# Patient Record
Sex: FEMALE | Race: WHITE | NOT HISPANIC OR LATINO | Employment: OTHER | URBAN - METROPOLITAN AREA
[De-identification: names, ages, dates, MRNs, and addresses within clinical notes are randomized per-mention and may not be internally consistent; named-entity substitution may affect disease eponyms.]

---

## 2017-11-29 ENCOUNTER — LAB VISIT (OUTPATIENT)
Dept: LAB | Facility: HOSPITAL | Age: 62
End: 2017-11-29
Attending: INTERNAL MEDICINE
Payer: MEDICAID

## 2017-11-29 ENCOUNTER — OFFICE VISIT (OUTPATIENT)
Dept: HEMATOLOGY/ONCOLOGY | Facility: CLINIC | Age: 62
End: 2017-11-29
Payer: MEDICAID

## 2017-11-29 VITALS
WEIGHT: 158.06 LBS | DIASTOLIC BLOOD PRESSURE: 74 MMHG | OXYGEN SATURATION: 99 % | HEIGHT: 65 IN | HEART RATE: 76 BPM | BODY MASS INDEX: 26.33 KG/M2 | TEMPERATURE: 98 F | SYSTOLIC BLOOD PRESSURE: 138 MMHG

## 2017-11-29 DIAGNOSIS — D69.6 DECREASED PLATELET COUNT: Primary | ICD-10-CM

## 2017-11-29 DIAGNOSIS — D69.6 DECREASED PLATELET COUNT: ICD-10-CM

## 2017-11-29 LAB
ERYTHROCYTE [DISTWIDTH] IN BLOOD BY AUTOMATED COUNT: 13.1 %
HCT VFR BLD AUTO: 41.6 %
HGB BLD-MCNC: 14.3 G/DL
MCH RBC QN AUTO: 29 PG
MCHC RBC AUTO-ENTMCNC: 34.4 G/DL
MCV RBC AUTO: 84 FL
NEUTROPHILS # BLD AUTO: 3.9 K/UL
PLATELET # BLD AUTO: 255 K/UL
PMV BLD AUTO: 10.7 FL
RBC # BLD AUTO: 4.93 M/UL
WBC # BLD AUTO: 6.61 K/UL

## 2017-11-29 PROCEDURE — 36415 COLL VENOUS BLD VENIPUNCTURE: CPT

## 2017-11-29 PROCEDURE — 99203 OFFICE O/P NEW LOW 30 MIN: CPT | Mod: PBBFAC | Performed by: INTERNAL MEDICINE

## 2017-11-29 PROCEDURE — 99999 PR PBB SHADOW E&M-NEW PATIENT-LVL III: CPT | Mod: PBBFAC,,, | Performed by: INTERNAL MEDICINE

## 2017-11-29 PROCEDURE — 85027 COMPLETE CBC AUTOMATED: CPT

## 2017-11-29 PROCEDURE — 99204 OFFICE O/P NEW MOD 45 MIN: CPT | Mod: S$PBB,,, | Performed by: INTERNAL MEDICINE

## 2017-11-29 RX ORDER — LANOLIN ALCOHOL/MO/W.PET/CERES
1000 CREAM (GRAM) TOPICAL DAILY
COMMUNITY

## 2017-11-29 RX ORDER — OMEPRAZOLE 10 MG/1
10 CAPSULE, DELAYED RELEASE ORAL DAILY
COMMUNITY

## 2017-11-29 RX ORDER — NAPROXEN SODIUM 220 MG/1
81 TABLET, FILM COATED ORAL DAILY
COMMUNITY

## 2017-11-29 RX ORDER — AMLODIPINE BESYLATE 5 MG/1
5 TABLET ORAL DAILY
COMMUNITY

## 2017-11-29 RX ORDER — MONTELUKAST SODIUM 10 MG/1
10 TABLET ORAL NIGHTLY
COMMUNITY

## 2017-11-29 RX ORDER — ROSUVASTATIN CALCIUM 5 MG/1
5 TABLET, COATED ORAL DAILY
COMMUNITY

## 2017-11-29 NOTE — PROGRESS NOTES
Chief Complaint :  Low Platelet count    Hx of Present illness :  Patient is a 62 y.o. year old female who presents to the clinic today for evaluation of low platelet count. Self referral. PCP did labs in Oct 2017; Told to have platelet count 115,000      Allergies :    Review of patient's allergies indicates:   Allergen Reactions    Erythromycin     Penicillins     Sulfur     Tetracyclines        Occupation :  Retired  &     Transfusion :  None    Menstrual & obstetric Hx :  1,Para 1.  Age of menarche:  14  Age of first pregnancy:  18  Lactation history: None   Age of menopause:  50  HRT: No    Present Meds :   Medication List with Changes/Refills   Current Medications    AMLODIPINE (NORVASC) 5 MG TABLET    Take 5 mg by mouth once daily.    ASPIRIN 81 MG CHEW    Take 81 mg by mouth once daily.    CYANOCOBALAMIN (VITAMIN B-12) 1000 MCG TABLET    Take 1,000 mcg by mouth once daily.    MAGNESIUM OXIDE-MG AA CHELATE (MG-PLUS-PROTEIN) 133 MG TAB    Take 250 mg by mouth once daily.    MONTELUKAST (SINGULAIR) 10 MG TABLET    Take 10 mg by mouth every evening.    OMEPRAZOLE (PRILOSEC) 10 MG CAPSULE    Take 10 mg by mouth once daily.    ROSUVASTATIN (CRESTOR) 5 MG TABLET    Take 5 mg by mouth once daily.       Past Medical Hx : been in relatively good health. Hx of Hypertension, asthma, DJD. No hx of DM, PUD, Hepatitis, Liver disease, TB, sarcoidosis, Lupus, rheumatoid arthritis, seizure disorder, CVA, DVT or PE> No past hx of Cancer, chemotherapy or radiation therapy. Last GYN exam . Had colonoscopy 2016.    Past Medical Hx :  Past Medical History:   Diagnosis Date    Arthritis     Asthma     Hypertension        Travel Hx :  N/A    Immunization :    There is no immunization history on file for this patient.    Family Hx :  Six maternal aunts had Breast Cancer. One Maternal uncle had cancer. Mother had metastatic breast & Colon cancer. Father had throat  cancer  Family History   Problem Relation Age of Onset    Breast cancer Mother     Cancer Father     Breast cancer Maternal Aunt     Cancer Maternal Uncle     Cancer Paternal Uncle        Social Hx : May have an occasional drink in several months  Social History     Social History    Marital status: Unknown     Spouse name: N/A    Number of children: N/A    Years of education: N/A     Occupational History    Not on file.     Social History Main Topics    Smoking status: Never Smoker    Smokeless tobacco: Never Used    Alcohol use Not on file    Drug use: Unknown    Sexual activity: Not on file     Other Topics Concern    Not on file     Social History Narrative    No narrative on file       Surgery :  Tonsillectomy; tubal Ligation. Multiple benign Breast Biopsy. Left foot spur surgery    Symptoms :    Review of Systems   Constitutional: Positive for malaise/fatigue. Negative for chills, diaphoresis (Tired a lot), fever and weight loss.   HENT: Negative for ear discharge, hearing loss, nosebleeds, sinus pain, sore throat and tinnitus.    Eyes: Negative for blurred vision, double vision, photophobia, pain and discharge.   Respiratory: Negative for cough, hemoptysis, sputum production, shortness of breath and wheezing.    Cardiovascular: Positive for leg swelling. Negative for chest pain, palpitations, orthopnea, claudication and PND.   Gastrointestinal: Positive for heartburn (On Prilosec). Negative for abdominal pain, blood in stool, constipation, diarrhea, nausea and vomiting.   Genitourinary: Negative for dysuria, flank pain, frequency, hematuria and urgency.   Musculoskeletal: Positive for joint pain (Lot of joint pains). Negative for back pain, falls and myalgias.   Skin: Negative for itching and rash.   Neurological: Positive for weakness. Negative for dizziness, tingling, tremors, sensory change, speech change, focal weakness, seizures, loss of consciousness and headaches.   Endo/Heme/Allergies:  Does not bruise/bleed easily.   Psychiatric/Behavioral: Negative for depression, hallucinations, memory loss, substance abuse and suicidal ideas. The patient is nervous/anxious ( A little nervous with visit today) and has insomnia (Poor sleep).        Physical Exam :   Nurse Lluvia present in the room  Physical Exam   Constitutional: She is oriented to person, place, and time and well-developed, well-nourished, and in no distress. No distress.   HENT:   Head: Normocephalic and atraumatic.   Right Ear: External ear normal.   Left Ear: External ear normal.   Nose: Nose normal.   Mouth/Throat: Oropharynx is clear and moist.   Eyes: Conjunctivae and EOM are normal. Pupils are equal, round, and reactive to light. Right eye exhibits no discharge. Left eye exhibits no discharge. No scleral icterus.   Neck: Normal range of motion. Neck supple. No JVD present. No tracheal deviation present. No thyromegaly present.   Cardiovascular: Normal rate, regular rhythm, normal heart sounds and intact distal pulses.    Pulmonary/Chest: Effort normal and breath sounds normal. No stridor. No respiratory distress. She has no wheezes. She has no rales. She exhibits no tenderness. Right breast exhibits no inverted nipple, no mass, no nipple discharge, no skin change and no tenderness. Left breast exhibits no inverted nipple, no mass, no nipple discharge, no skin change and no tenderness.       Abdominal: Soft. Normal appearance, normal aorta and bowel sounds are normal. She exhibits no distension and no mass. There is no splenomegaly or hepatomegaly. There is no tenderness. There is no rebound, no guarding and no CVA tenderness. No hernia.   Genitourinary:   Genitourinary Comments: Not Examined   Musculoskeletal: Normal range of motion.   Lymphadenopathy:        Head (right side): No submental, no submandibular, no tonsillar, no preauricular, no posterior auricular and no occipital adenopathy present.        Head (left side): No submental,  no submandibular, no tonsillar, no preauricular, no posterior auricular and no occipital adenopathy present.     She has no cervical adenopathy.     She has no axillary adenopathy.        Right: No inguinal, no supraclavicular and no epitrochlear adenopathy present.        Left: No inguinal, no supraclavicular and no epitrochlear adenopathy present.   Neurological: She is alert and oriented to person, place, and time. She has normal motor skills, normal sensation, normal strength, normal reflexes and intact cranial nerves. Gait normal. GCS score is 15.   Skin: Skin is warm, dry and intact. No rash noted. She is not diaphoretic. No cyanosis. Nails show no clubbing.   Psychiatric: Mood, memory, affect and judgment normal.         Labs & Imaging :   On 10/11/17 : Plts 115,000. Hgb 14.7; Hct 44.6; Red Cell indices normal. ANC 6,047 CMP normal. Uric acid 4.9 Rheumatoid Factor neg. Hep C antibody Neg.  ESR 6mm/hr      Dx :  Mild Thrombocytopenia    Assessment & Plan:  Reviewed with patient. Will repeat CBC, Plt Count today. If counts still low, further studies will be ordered . Patient agreeable. RTC one week.

## 2017-12-07 ENCOUNTER — OFFICE VISIT (OUTPATIENT)
Dept: HEMATOLOGY/ONCOLOGY | Facility: CLINIC | Age: 62
End: 2017-12-07
Payer: MEDICAID

## 2017-12-07 VITALS
OXYGEN SATURATION: 97 % | BODY MASS INDEX: 26.1 KG/M2 | TEMPERATURE: 98 F | WEIGHT: 156.88 LBS | SYSTOLIC BLOOD PRESSURE: 156 MMHG | DIASTOLIC BLOOD PRESSURE: 68 MMHG | HEART RATE: 70 BPM

## 2017-12-07 DIAGNOSIS — D69.6 DECREASED PLATELET COUNT: Primary | ICD-10-CM

## 2017-12-07 PROCEDURE — 99213 OFFICE O/P EST LOW 20 MIN: CPT | Mod: PBBFAC | Performed by: INTERNAL MEDICINE

## 2017-12-07 PROCEDURE — 99213 OFFICE O/P EST LOW 20 MIN: CPT | Mod: S$PBB,,, | Performed by: INTERNAL MEDICINE

## 2017-12-07 PROCEDURE — 99999 PR PBB SHADOW E&M-EST. PATIENT-LVL III: CPT | Mod: PBBFAC,,, | Performed by: INTERNAL MEDICINE

## 2017-12-07 NOTE — PROGRESS NOTES
Chief Complaint :  Low Platelet count    Hx of Present illness :  Patient is a 62 y.o. year old female who presents to the clinic today for followup.       Allergies :    Review of patient's allergies indicates:   Allergen Reactions    Erythromycin     Penicillins     Sulfur     Tetracyclines        Occupation :  Retired  &     Transfusion :  None    Menstrual & obstetric Hx :  1,Para 1.  Age of menarche:  14  Age of first pregnancy:  18  Lactation history: None   Age of menopause:  50  HRT: No    Present Meds :   Medication List with Changes/Refills   Current Medications    AMLODIPINE (NORVASC) 5 MG TABLET    Take 5 mg by mouth once daily.    ASPIRIN 81 MG CHEW    Take 81 mg by mouth once daily.    CYANOCOBALAMIN (VITAMIN B-12) 1000 MCG TABLET    Take 1,000 mcg by mouth once daily.    MAGNESIUM OXIDE-MG AA CHELATE (MG-PLUS-PROTEIN) 133 MG TAB    Take 250 mg by mouth once daily.    MONTELUKAST (SINGULAIR) 10 MG TABLET    Take 10 mg by mouth every evening.    OMEPRAZOLE (PRILOSEC) 10 MG CAPSULE    Take 10 mg by mouth once daily.    ROSUVASTATIN (CRESTOR) 5 MG TABLET    Take 5 mg by mouth once daily.       Past Medical Hx : been in relatively good health. Hx of Hypertension, asthma, DJD. No hx of DM, PUD, Hepatitis, Liver disease, TB, sarcoidosis, Lupus, rheumatoid arthritis, seizure disorder, CVA, DVT or PE> No past hx of Cancer, chemotherapy or radiation therapy. Last GYN exam . Had colonoscopy 2016.    Past Medical Hx :  Past Medical History:   Diagnosis Date    Arthritis     Asthma     Hypertension        Travel Hx :  N/A    Immunization :    There is no immunization history on file for this patient.    Family Hx :  Six maternal aunts had Breast Cancer. One Maternal uncle had cancer. Mother had metastatic breast & Colon cancer. Father had throat cancer  Family History   Problem Relation Age of Onset    Breast cancer Mother     Cancer Father     Breast  cancer Maternal Aunt     Cancer Maternal Uncle     Cancer Paternal Uncle        Social Hx : May have an occasional drink in several months  Social History     Social History    Marital status: Unknown     Spouse name: N/A    Number of children: N/A    Years of education: N/A     Occupational History    Not on file.     Social History Main Topics    Smoking status: Never Smoker    Smokeless tobacco: Never Used    Alcohol use Not on file    Drug use: Unknown    Sexual activity: Not on file     Other Topics Concern    Not on file     Social History Narrative    No narrative on file       Surgery :  Tonsillectomy; tubal Ligation. Multiple benign Breast Biopsy. Left foot spur surgery    Symptoms :   No New Sx  Review of Systems   Constitutional: Positive for malaise/fatigue. Negative for chills, diaphoresis (Tired a lot), fever and weight loss.   HENT: Negative for ear discharge, hearing loss, nosebleeds, sinus pain, sore throat and tinnitus.    Eyes: Negative for blurred vision, double vision, photophobia, pain and discharge.   Respiratory: Negative for cough, hemoptysis, sputum production, shortness of breath and wheezing.    Cardiovascular: Positive for leg swelling. Negative for chest pain, palpitations, orthopnea, claudication and PND.   Gastrointestinal: Positive for heartburn (On Prilosec). Negative for abdominal pain, blood in stool, constipation, diarrhea, nausea and vomiting.   Genitourinary: Negative for dysuria, flank pain, frequency, hematuria and urgency.   Musculoskeletal: Positive for joint pain (Lot of joint pains). Negative for back pain, falls and myalgias.   Skin: Negative for itching and rash.   Neurological: Positive for weakness. Negative for dizziness, tingling, tremors, sensory change, speech change, focal weakness, seizures, loss of consciousness and headaches.   Endo/Heme/Allergies: Does not bruise/bleed easily.   Psychiatric/Behavioral: Negative for depression, hallucinations,  memory loss, substance abuse and suicidal ideas. The patient is nervous/anxious ( A little nervous with visit today) and has insomnia (Poor sleep).        Physical Exam :     Physical Exam   Constitutional: She is oriented to person, place, and time and well-developed, well-nourished, and in no distress. No distress.   HENT:   Head: Normocephalic and atraumatic.   Right Ear: External ear normal.   Left Ear: External ear normal.   Nose: Nose normal.   Mouth/Throat: Oropharynx is clear and moist.   Eyes: Conjunctivae and EOM are normal. Pupils are equal, round, and reactive to light. Right eye exhibits no discharge. Left eye exhibits no discharge. No scleral icterus.   Neck: Normal range of motion. Neck supple. No JVD present. No tracheal deviation present. No thyromegaly present.   Cardiovascular: Normal rate, regular rhythm, normal heart sounds and intact distal pulses.    Pulmonary/Chest: Effort normal and breath sounds normal. No stridor. No respiratory distress. She has no wheezes. She has no rales. She exhibits no tenderness. Right breast exhibits no inverted nipple, no mass, no nipple discharge, no skin change and no tenderness. Left breast exhibits no inverted nipple, no mass, no nipple discharge, no skin change and no tenderness.       Abdominal: Soft. Normal appearance, normal aorta and bowel sounds are normal. She exhibits no distension and no mass. There is no splenomegaly or hepatomegaly. There is no tenderness. There is no rebound, no guarding and no CVA tenderness. No hernia.   Genitourinary:   Genitourinary Comments: Not Examined   Musculoskeletal: Normal range of motion.   Lymphadenopathy:        Head (right side): No submental, no submandibular, no tonsillar, no preauricular, no posterior auricular and no occipital adenopathy present.        Head (left side): No submental, no submandibular, no tonsillar, no preauricular, no posterior auricular and no occipital adenopathy present.     She has no  cervical adenopathy.     She has no axillary adenopathy.        Right: No inguinal, no supraclavicular and no epitrochlear adenopathy present.        Left: No inguinal, no supraclavicular and no epitrochlear adenopathy present.   Neurological: She is alert and oriented to person, place, and time. She has normal motor skills, normal sensation, normal strength, normal reflexes and intact cranial nerves. Gait normal. GCS score is 15.   Skin: Skin is warm, dry and intact. No rash noted. She is not diaphoretic. No cyanosis. Nails show no clubbing.   Psychiatric: Mood, memory, affect and judgment normal.   No New physical finding      Labs & Imaging :   On 10/11/17 : Plts 115,000. Hgb 14.7; Hct 44.6; Red Cell indices normal. ANC 6,047 CMP normal. Uric acid 4.9 Rheumatoid Factor neg. Hep C antibody Neg.  ESR 6mm/hr  11/30/17 : Hgb 14.3; Hct 41.6 Normal indices. Plts 255,000 ANC 3,900    Dx :  Mild Thrombocytopenia resolved    Assessment & Plan:  Reviewed with patient.   Recommend Monitor CBC peridically. Patient agreeable